# Patient Record
Sex: MALE | Race: WHITE | NOT HISPANIC OR LATINO | Employment: FULL TIME | URBAN - METROPOLITAN AREA
[De-identification: names, ages, dates, MRNs, and addresses within clinical notes are randomized per-mention and may not be internally consistent; named-entity substitution may affect disease eponyms.]

---
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
## 2024-02-13 PROCEDURE — 87661 TRICHOMONAS VAGINALIS AMPLIF: CPT | Performed by: FAMILY MEDICINE

## 2024-02-13 PROCEDURE — 87491 CHLMYD TRACH DNA AMP PROBE: CPT | Performed by: FAMILY MEDICINE

## 2024-02-13 PROCEDURE — 87591 N.GONORRHOEAE DNA AMP PROB: CPT | Performed by: FAMILY MEDICINE

## 2025-01-10 ENCOUNTER — APPOINTMENT (OUTPATIENT)
Dept: CT IMAGING | Facility: HOSPITAL | Age: 25
End: 2025-01-10
Payer: OTHER MISCELLANEOUS

## 2025-01-10 ENCOUNTER — HOSPITAL ENCOUNTER (EMERGENCY)
Facility: HOSPITAL | Age: 25
Discharge: HOME | End: 2025-01-10
Attending: EMERGENCY MEDICINE
Payer: OTHER MISCELLANEOUS

## 2025-01-10 VITALS
WEIGHT: 181.22 LBS | BODY MASS INDEX: 25.94 KG/M2 | SYSTOLIC BLOOD PRESSURE: 134 MMHG | HEART RATE: 95 BPM | RESPIRATION RATE: 18 BRPM | HEIGHT: 70 IN | TEMPERATURE: 97.5 F | OXYGEN SATURATION: 97 % | DIASTOLIC BLOOD PRESSURE: 60 MMHG

## 2025-01-10 DIAGNOSIS — S01.511A LIP LACERATION, INITIAL ENCOUNTER: Primary | ICD-10-CM

## 2025-01-10 PROCEDURE — 25010000002 LIDOCAINE 1% - EPINEPHRINE 1:100000 1 %-1:100000 SOLUTION: Performed by: EMERGENCY MEDICINE

## 2025-01-10 PROCEDURE — 90471 IMMUNIZATION ADMIN: CPT | Performed by: EMERGENCY MEDICINE

## 2025-01-10 PROCEDURE — 90715 TDAP VACCINE 7 YRS/> IM: CPT | Performed by: EMERGENCY MEDICINE

## 2025-01-10 PROCEDURE — 70486 CT MAXILLOFACIAL W/O DYE: CPT

## 2025-01-10 PROCEDURE — 25010000002 TETANUS-DIPHTH-ACELL PERTUSSIS 5-2.5-18.5 LF-MCG/0.5 SUSPENSION PREFILLED SYRINGE: Performed by: EMERGENCY MEDICINE

## 2025-01-10 PROCEDURE — 99284 EMERGENCY DEPT VISIT MOD MDM: CPT

## 2025-01-10 RX ORDER — LIDOCAINE HYDROCHLORIDE AND EPINEPHRINE 10; 10 MG/ML; UG/ML
10 INJECTION, SOLUTION INFILTRATION; PERINEURAL ONCE
Status: COMPLETED | OUTPATIENT
Start: 2025-01-10 | End: 2025-01-10

## 2025-01-10 RX ADMIN — LIDOCAINE HYDROCHLORIDE,EPINEPHRINE BITARTRATE 10 ML: 10; .01 INJECTION, SOLUTION INFILTRATION; PERINEURAL at 14:46

## 2025-01-10 RX ADMIN — TETANUS TOXOID, REDUCED DIPHTHERIA TOXOID AND ACELLULAR PERTUSSIS VACCINE, ADSORBED 0.5 ML: 5; 2.5; 8; 8; 2.5 SUSPENSION INTRAMUSCULAR at 14:46

## 2025-01-10 NOTE — DISCHARGE INSTRUCTIONS
Follow-up with occupational health in 1 week for wound check.  Return to for swelling, redness, fever, pus or any other concern

## 2025-01-10 NOTE — Clinical Note
[facility] EMERGENCY DEPARTMENT  [address] [place] [address]  Phone: [phone]    Michael Ayala was seen and treated in our emergency department on 1/10/2025.  He may return to work on 01/11/2025.         Thank you for choosing [facility].    Joel Pyle MD

## 2025-01-10 NOTE — ED PROVIDER NOTES
"Subjective   History of Present Illness  24-year-old male states he was hit in the lip with a board at work today.  He stained a laceration to his upper and lower lip on the right side.  He denies any loss of consciousness or neck or back pain or any other injury.  He denies any dental pain or injury or malocclusion or trismus  Review of Systems    No past medical history on file.    No Known Allergies    Past Surgical History:   Procedure Laterality Date    ABDOMINAL SURGERY      as an infant, intestine was twisted?       No family history on file.    Social History     Socioeconomic History    Marital status: Single   Tobacco Use    Smoking status: Every Day     Current packs/day: 1.50     Types: Cigarettes     Passive exposure: Current   Vaping Use    Vaping status: Every Day    Substances: Nicotine    Devices: Disposable   Substance and Sexual Activity    Alcohol use: Yes    Drug use: Never    Sexual activity: Yes     Prior to Admission medications    Not on File     /60   Pulse 95   Temp 97.5 °F (36.4 °C) (Oral)   Resp 18   Ht 177.8 cm (70\")   Wt 82.2 kg (181 lb 3.5 oz)   SpO2 97%   BMI 26.00 kg/m²         Objective   Physical Exam  General: Well-appearing, no acute distress  Psych: Oriented, pleasant affect  Normocephalic, atraumatic except for abrasion on his right nasal fold and a laceration of his upper lip and lower lip on the right lateral aspect it does not involve the commissure it does cross the vermilion border on both the upper and lower lip.  Total laceration 3 cm, dentition appears normal with and atraumatic, midface and mandible are stable and nontender there is no raccoon eyes or Escalante sign, GCS 15 C-spine nontender  Respirations: Clear, nonlabored respirations  Skin: No rash, normal color  Laceration Repair    Date/Time: 1/10/2025 3:32 PM    Performed by: Joel Pyle MD  Authorized by: Joel Pyle MD    Consent:     Consent obtained:  Verbal    Consent given by:  " Patient    Risks, benefits, and alternatives were discussed: yes      Risks discussed:  Infection, poor cosmetic result and poor wound healing    Alternatives discussed:  No treatment  Universal protocol:     Procedure explained and questions answered to patient or proxy's satisfaction: yes      Patient identity confirmed:  Verbally with patient  Anesthesia:     Anesthesia method:  Local infiltration    Local anesthetic:  Lidocaine 1% WITH epi  Laceration details:     Location:  Lip    Lip location:  Upper lip, full thickness and lower lip, full thickness    Vermilion border involved: yes      Length (cm):  3  Pre-procedure details:     Preparation:  Patient was prepped and draped in usual sterile fashion and imaging obtained to evaluate for foreign bodies  Treatment:     Area cleansed with:  Mitchel-Rita    Irrigation solution:  Tap water    Layers/structures repaired:  Deep subcutaneous  Deep subcutaneous:     Suture size:  5-0    Suture material:  Vicryl    Suture technique:  Simple interrupted    Number of sutures:  3  Skin repair:     Repair method:  Sutures    Suture size:  6-0    Suture material:  Plain gut    Suture technique:  Simple interrupted    Number of sutures:  12  Approximation:     Approximation:  Close    Vermilion border well-aligned: yes    Repair type:     Repair type:  Intermediate  Post-procedure details:     Procedure completion:  Tolerated             ED Course        CT Facial Bones Without Contrast    Result Date: 1/10/2025  Impression: 1. No acute facial bone fracture. 2. Laceration of the right upper lip with right mid face soft tissue swelling. Electronically Signed: Anastasiya Nash MD  1/10/2025 2:11 PM EST  Workstation ID: [id number]                                                  Medical Decision Making  Patient has no signs of head injury or spinal injury.  His laceration was repaired as above.  We discussed some wound care instructions including Vaseline or antibiotic ointment  application as well as warning signs for return.  We also discussed the potential for future scar revision.  Patient was discharged good condition     Problems Addressed:  Lip laceration, initial encounter: complicated acute illness or injury    Amount and/or Complexity of Data Reviewed  Radiology: ordered.    Risk  Prescription drug management.        Final diagnoses:   Lip laceration, initial encounter       ED Disposition  ED Disposition       ED Disposition   Discharge    Condition   Stable    Comment   --               No follow-up provider specified.       Medication List      No changes were made to your prescriptions during this visit.            Joel Pyle MD  01/10/25 1531